# Patient Record
Sex: FEMALE | Race: WHITE | NOT HISPANIC OR LATINO | URBAN - METROPOLITAN AREA
[De-identification: names, ages, dates, MRNs, and addresses within clinical notes are randomized per-mention and may not be internally consistent; named-entity substitution may affect disease eponyms.]

---

## 2019-04-02 ENCOUNTER — IMPORTED ENCOUNTER (OUTPATIENT)
Dept: URBAN - METROPOLITAN AREA CLINIC 1 | Facility: CLINIC | Age: 69
End: 2019-04-02

## 2019-04-02 PROBLEM — H25.89: Noted: 2019-04-02

## 2019-04-02 PROBLEM — H43.811: Noted: 2019-04-02

## 2019-04-02 PROBLEM — H35.413: Noted: 2019-04-02

## 2019-04-02 PROBLEM — H35.371: Noted: 2019-04-02

## 2019-04-02 PROBLEM — H25.813: Noted: 2019-04-02

## 2019-04-02 PROBLEM — H40.023: Noted: 2019-04-02

## 2019-04-02 PROCEDURE — 92004 COMPRE OPH EXAM NEW PT 1/>: CPT

## 2019-04-02 PROCEDURE — 92015 DETERMINE REFRACTIVE STATE: CPT

## 2019-04-02 PROCEDURE — 92250 FUNDUS PHOTOGRAPHY W/I&R: CPT

## 2019-04-02 NOTE — PATIENT DISCUSSION
1.  Cataract OU: Observe for now without intervention. The patient was advised to contact us if any change or worsening of vision2. Glaucoma Suspect OU (CD 0.80 OU): PXE OS. Baseline photo done today showing disc cupping OU. Patient is considered high risk. Condition was discussed with patient and patient understands. Will continue to monitor patient for any progression in condition. Patient was advised to call us with any problems questions or concerns. 3.  Pseudoexfoliation OS - Observe 4. Epiretinal Membrane OD - Observe for change. 5. 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 Patient was cautioned to call our office immediately if they experience a substantial change in their symptoms such as an increase in floaters persistent flashes loss of visual field (may appear as a shadow or a curtain) or decrease in visual acuity as these may indicate a retinal tear or detachment. 6.  PVD w/o Tear OD - RD precautions. MRX for glasses givenReturn for an appointment in 6 months 10/DFE/OCT/glare with Dr. Veto Arevalo.

## 2019-04-02 NOTE — PATIENT DISCUSSION
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 Patient was cautioned to call our office immediately if they experience a substantial change in their symptoms such as an increase in floaters persistent flashes loss of visual field (may appear as a shadow or a curtain) or decrease in visual acuity as these may indicate a retinal tear or detachment.

## 2019-12-10 ENCOUNTER — IMPORTED ENCOUNTER (OUTPATIENT)
Dept: URBAN - METROPOLITAN AREA CLINIC 1 | Facility: CLINIC | Age: 69
End: 2019-12-10

## 2019-12-10 PROBLEM — H00.14: Noted: 2019-12-10

## 2019-12-10 PROBLEM — H01.004: Noted: 2019-12-10

## 2019-12-10 PROBLEM — H01.001: Noted: 2019-12-10

## 2019-12-10 PROCEDURE — 92012 INTRM OPH EXAM EST PATIENT: CPT

## 2019-12-10 NOTE — PATIENT DISCUSSION
1.  Early Chalazion KEELEY with early cellulitis - Keflex 500mg po BID x10 days (Disp 20) Begin Hot compresses TID x 5 minutes for 3 weeks. If without improvement discussed with patient possible Incision and Drainage procedure. Risks and benefits discussed with patient and patient states full understanding. 2. Posterior Blepharitis OU - Begin Daily hot compresses and lid scrubs were recommended. 3. Cataract OU: Observe for now without intervention. The patient was advised to contact us if any change or worsening of vision4. Glaucoma Suspect OU (CD 0.80 OU): PXE OS. Return for baseline testing once lids improved. 5.  Pseudoexfoliation OS - Observe 6. H/o Epiretinal Membrane OD 7. H/o Lattice Degeneration OU  8. H/o PVD ODReturn for an appointment in 1-2 week 10 OCT with Dr. Leona Pearl.

## 2019-12-23 ENCOUNTER — IMPORTED ENCOUNTER (OUTPATIENT)
Dept: URBAN - METROPOLITAN AREA CLINIC 1 | Facility: CLINIC | Age: 69
End: 2019-12-23

## 2019-12-23 PROBLEM — H00.15: Noted: 2019-12-23

## 2019-12-23 PROBLEM — H00.11: Noted: 2019-12-23

## 2019-12-23 PROBLEM — H00.14: Noted: 2019-12-23

## 2019-12-23 PROBLEM — H40.1131: Noted: 2019-12-23

## 2019-12-23 PROCEDURE — 92012 INTRM OPH EXAM EST PATIENT: CPT

## 2019-12-23 PROCEDURE — 92133 CPTRZD OPH DX IMG PST SGM ON: CPT

## 2019-12-23 NOTE — PATIENT DISCUSSION
1.  Chalazion RUL with early cellulitis- Begin Doxycycline 100mg po BID (Disp #60) Hot compresses TID x 5 minutes for 3 weeks. If without improvement discussed with patient possible Incision and Drainage procedure. Risks and benefits discussed with patient and patient states full understanding. 2. Residual Chalazion KEELEY- Cont Hot compresses TID x5 minutes daily w/ lid scrubs OU. 3.  Mild COAG OU (CD 0.8 OU) OCT shows mild thinning OU. Will hold on starting monotrial and consider starting gtts once chalazions are resolved. 4.  PVD OD- stable RD Precautions. 5.  Cataracts OU- observe 6. Pseudoexfoliation OS 7. Lattice Degeneration OU- stable RD Precautions. 8.  Anterior Blepharitis OU- Restart Hot compresses daily. Return for an appointment in 2-3 weeks 10 with Dr. Edith Aguilera.

## 2019-12-23 NOTE — PATIENT DISCUSSION
Chalazion left lower eyelid - Hot compresses TID x 5 minutes for 3 weeks. If without improvement discussed with patient possible Incision and Drainage procedure. Risks and benefits discussed with patient and patient states full understanding.

## 2020-01-09 ENCOUNTER — IMPORTED ENCOUNTER (OUTPATIENT)
Dept: URBAN - METROPOLITAN AREA CLINIC 1 | Facility: CLINIC | Age: 70
End: 2020-01-09

## 2020-01-09 PROBLEM — H40.1131: Noted: 2020-01-09

## 2020-01-09 PROBLEM — H00.14: Noted: 2020-01-09

## 2020-01-09 PROBLEM — H01.001: Noted: 2020-01-09

## 2020-01-09 PROBLEM — H00.11: Noted: 2020-01-09

## 2020-01-09 PROBLEM — H01.004: Noted: 2020-01-09

## 2020-01-09 PROCEDURE — 92012 INTRM OPH EXAM EST PATIENT: CPT

## 2020-01-09 NOTE — PATIENT DISCUSSION
1.  Chalazion RUL with early cellulitis- Resolved today. Cont Hot compresses x5 minutes daily. 2.  Residual Chalazion KEELEY- Resolved. Cont Hot compresses TID x5 minutes daily w/ lid scrubs OU. 3.  Mild COAG OU (CD 0.8 OU) Will begin COAG treatment this visit given improvement with lids this visit. Begin monotrial of Xelpros QHS OD (Sample given). Will recheck patient in 1 month check IOP on monotrial OD. 4.  PVD OD- stable RD Precautions. 5.  Cataracts OU- observe 6. Pseudoexfoliation OS 7. Lattice Degeneration OU- stable RD Precautions. 8.  Anterior Blepharitis OU- Cont Hot compresses x5 minutes daily. Return for an appointment in 1 mo 10 (check IOP on monotrial OD) with Dr. Marisol Traylor.

## 2020-02-18 ENCOUNTER — IMPORTED ENCOUNTER (OUTPATIENT)
Dept: URBAN - METROPOLITAN AREA CLINIC 1 | Facility: CLINIC | Age: 70
End: 2020-02-18

## 2020-02-18 PROBLEM — H40.1131: Noted: 2020-02-18

## 2020-02-18 PROCEDURE — 92012 INTRM OPH EXAM EST PATIENT: CPT

## 2020-02-18 NOTE — PATIENT DISCUSSION
1.  Mild COAG OU (CD 0.8 OU) IOP much improved on monotrial of Xelpros OD. Will now switch to Latanoprost and use QHS OU. Rx sent to patient's pharmacy. 2.  PVD OD- stable RD Precautions. 3.  Cataracts OU- observe 4. Pseudoexfoliation OS 5. Lattice Degeneration OU- stable RD Precautions. 6.  Anterior Blepharitis OU- Cont Hot compresses x5 minutes daily. Return for an appointment in 5 mo 30 VF 24-2 OU with Dr. Cecil Kuhn.

## 2020-08-03 ENCOUNTER — IMPORTED ENCOUNTER (OUTPATIENT)
Dept: URBAN - METROPOLITAN AREA CLINIC 1 | Facility: CLINIC | Age: 70
End: 2020-08-03

## 2020-08-03 PROBLEM — H25.813: Noted: 2020-08-03

## 2020-08-03 PROBLEM — H40.1131: Noted: 2020-08-03

## 2020-08-03 PROBLEM — H35.413: Noted: 2020-08-03

## 2020-08-03 PROBLEM — H35.371: Noted: 2020-08-03

## 2020-08-03 PROCEDURE — 92083 EXTENDED VISUAL FIELD XM: CPT

## 2020-08-03 PROCEDURE — 92014 COMPRE OPH EXAM EST PT 1/>: CPT

## 2020-08-03 NOTE — PATIENT DISCUSSION
1.  Mild Open Angle Glaucoma OU -- (CD 0.8 OU) IOP stable OU w/ compliance. Cont Latanoprost QHS OU. Patient to continue with current gtt regimen. Patient advised to be compliant with gtts. 2.  Cataract OU -- Observe for now without intervention. The patient was advised to contact us if any change or worsening of vision3. Pseudoexfoliation OS  4. Epiretinal Membrane OD -- Observe for change. 5. 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 RD precautions. 6.  Anterior Blepharitis OU- Cont Hot compresses x5 minutes daily. 7. PVD OD -- Stable. RD precautions. Patient defers MRx today. Return for an appointment in 6 months for a 10/DFE/OCT/glare with Dr. Pee Quispe.

## 2021-02-05 ENCOUNTER — IMPORTED ENCOUNTER (OUTPATIENT)
Dept: URBAN - METROPOLITAN AREA CLINIC 1 | Facility: CLINIC | Age: 71
End: 2021-02-05

## 2021-02-05 PROBLEM — H25.813: Noted: 2021-02-05

## 2021-02-05 PROBLEM — H40.1131: Noted: 2021-02-05

## 2021-02-05 PROCEDURE — 92015 DETERMINE REFRACTIVE STATE: CPT

## 2021-02-05 PROCEDURE — 92012 INTRM OPH EXAM EST PATIENT: CPT

## 2021-02-05 PROCEDURE — 92133 CPTRZD OPH DX IMG PST SGM ON: CPT

## 2021-02-05 NOTE — PATIENT DISCUSSION
1.  Mild Open Angle Glaucoma OU -- (CD 0.80 OU) No progression by OCT OU. IOP stable OU w/ compliance. Cont Latanoprost QHS OU. Patient to continue with current gtt regimen. Patient advised to be compliant with gtts. 2.  Cataract OU:  Visually Significant secondary to glare discussed the risks benefits alternatives and limitations of cataract surgery. The patient stated a full understanding and a desire to proceed with the procedure. The patient will need to return for preop appointment with cataract measurements and to have any additional questions answered and start pre-operative eye drops as directed. Phaco PCL OS then ODOtherwise follow-up 6 months 30/glare3. Pseudoexfoliation OS  4. Epiretinal Membrane OD -- Stable. Observe for change. 5. 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 RD precautions. 6.  Anterior Blepharitis OU- Cont Hot compresses x5 minutes daily. 7. PVD OD -- Stable. RD precautions. Return for an appointment for Ascjazmyne/H and P. with Dr. Claudeen Cobble.

## 2021-02-12 ENCOUNTER — IMPORTED ENCOUNTER (OUTPATIENT)
Dept: URBAN - METROPOLITAN AREA CLINIC 1 | Facility: CLINIC | Age: 71
End: 2021-02-12

## 2021-02-12 PROBLEM — H25.812: Noted: 2021-02-12

## 2021-02-12 PROCEDURE — 92136 OPHTHALMIC BIOMETRY: CPT

## 2021-02-12 NOTE — PATIENT DISCUSSION
1. Cataract OS -- Visually Significant secondary to glare discussed the risks benefits alternatives and limitations of cataract surgery. The patient stated a full understanding and a desire to proceed with the procedure. Discussed with patient if PO Gtts are more than $120 for all three combined when filling at their Pharmacy please call our office to request generic substitutions. Pt came to pre-op appointment alone and Lifestyle Questionnaire Completed. Pt understands they will need glasses post-op to achieve their best corrected vision.  *H/o PXE OSPhaco PCL OS

## 2021-02-19 ENCOUNTER — IMPORTED ENCOUNTER (OUTPATIENT)
Dept: URBAN - METROPOLITAN AREA CLINIC 1 | Facility: CLINIC | Age: 71
End: 2021-02-19

## 2021-02-20 ENCOUNTER — IMPORTED ENCOUNTER (OUTPATIENT)
Dept: URBAN - METROPOLITAN AREA CLINIC 1 | Facility: CLINIC | Age: 71
End: 2021-02-20

## 2021-02-20 PROBLEM — Z96.1: Noted: 2021-02-20

## 2021-02-20 PROCEDURE — 99024 POSTOP FOLLOW-UP VISIT: CPT

## 2021-02-20 NOTE — PATIENT DISCUSSION
POD#1 CE/IOL OS (Standard) -- doing well. Use Lotemax BID OS Prolensa Qdaily OS Ofloxacin TID OS. Use all three gtts through completion of PO gtt chart regimen/ Per our instructions given to patient.   Post op Warnings Reiterated RTC as scheduled ***Cont Latanoprost QHS

## 2021-02-25 ENCOUNTER — IMPORTED ENCOUNTER (OUTPATIENT)
Dept: URBAN - METROPOLITAN AREA CLINIC 1 | Facility: CLINIC | Age: 71
End: 2021-02-25

## 2021-02-25 PROBLEM — H25.811: Noted: 2021-02-25

## 2021-02-25 PROCEDURE — 92136 OPHTHALMIC BIOMETRY: CPT

## 2021-02-25 NOTE — PATIENT DISCUSSION
1.  Cataract OD: Visually Significant secondary to glare discussed the risks benefits alternatives and limitations of cataract surgery. The patient stated a full understanding and a desire to proceed with the procedure. Discussed with patient if PO Gtts are more than $120 for all three combined when filling at their Pharmacy please call our office to request generic substitutions. Pt understands they will need glasses post-op to achieve their best corrected vision. Phaco PCL OD *H/o ERM OD 2. POW#3  CE/IOL OS (Standard) -- doing well. *PXE OS  Use Lotemax BID OS and Prolensa Qdaily OS: Use gtts through completion of PO gtt regimen.  **Cont Latanoprost QHS OUF/u as scheduled 2nd eye

## 2021-03-03 ENCOUNTER — IMPORTED ENCOUNTER (OUTPATIENT)
Dept: URBAN - METROPOLITAN AREA CLINIC 1 | Facility: CLINIC | Age: 71
End: 2021-03-03

## 2021-03-04 ENCOUNTER — IMPORTED ENCOUNTER (OUTPATIENT)
Dept: URBAN - METROPOLITAN AREA CLINIC 1 | Facility: CLINIC | Age: 71
End: 2021-03-04

## 2021-03-04 PROBLEM — Z96.1: Noted: 2021-03-04

## 2021-03-04 PROCEDURE — 99024 POSTOP FOLLOW-UP VISIT: CPT

## 2021-03-04 NOTE — PATIENT DISCUSSION
1. POD#1 Phaco/ PCL OD (Standard) - doing well. *H/o ERM OD Use Lotemax BID OD Prolensa Qdaily OD Ocuflox TID OD : Use all three gtts through completion of PO gtt chart regimen/ Per our instructions given. Post op Warnings Reiterated 2. POW#3 Phaco/ PCL OS (Standard) - doing well *PXE OS Use Lotemax BID OS and Prolensa Qdaily OS: Use gtts through completion of PO gtt regimen.  **Cont Latanoprost QHS OURTC as scheduled

## 2021-03-26 ENCOUNTER — IMPORTED ENCOUNTER (OUTPATIENT)
Dept: URBAN - METROPOLITAN AREA CLINIC 1 | Facility: CLINIC | Age: 71
End: 2021-03-26

## 2021-03-26 PROBLEM — Z09: Noted: 2021-03-26

## 2021-03-26 PROCEDURE — 99024 POSTOP FOLLOW-UP VISIT: CPT

## 2021-03-26 NOTE — PATIENT DISCUSSION
POW#3 Phaco/ PCL OU (Standard OU) - doing well. *PXE OS. *H/o ERM OD. Finish PO meds per PO gtt schedule MRX for glasses given **Cont Latanorpost QHS OUReturn for an appointment in August 30/HVF with Dr. Chris Ruiz.

## 2021-08-30 ENCOUNTER — IMPORTED ENCOUNTER (OUTPATIENT)
Dept: URBAN - METROPOLITAN AREA CLINIC 1 | Facility: CLINIC | Age: 71
End: 2021-08-30

## 2021-08-30 PROBLEM — H40.1131: Noted: 2021-08-30

## 2021-08-30 PROBLEM — H43.811: Noted: 2021-08-30

## 2021-08-30 PROBLEM — H35.371: Noted: 2021-08-30

## 2021-08-30 PROBLEM — H04.123: Noted: 2021-08-30

## 2021-08-30 PROBLEM — H01.004: Noted: 2021-08-30

## 2021-08-30 PROBLEM — Z96.1: Noted: 2021-08-30

## 2021-08-30 PROBLEM — H01.001: Noted: 2021-08-30

## 2021-08-30 PROCEDURE — 99214 OFFICE O/P EST MOD 30 MIN: CPT

## 2021-08-30 NOTE — PATIENT DISCUSSION
1.  Mild Open Angle Glaucoma OU (CD 0.80 OU) - IOP stable continue Latanoprost QHS OU. Patient advised to be compliant with gtts. Condition was discussed with patient and patient understands. Will continue to monitor patient for any progression in condition. Patient was advised to call us with any problems questions or concerns. 2.  Epiretinal Membrane OD - Observe for change. 3. Dry Eyes OU - Recommend ATs TID OU routinely 4. Pseudophakia OU - (Standard OU) 5. Anterior Blepharitis OU - Daily Hot compresses and lid scrubs were recommended. 6. PVD w/o Tear OD - RD precautions. Patient defers the refraction at today's visitReturn for an appointment in 6 months 10/OCT with Dr. Rosana Cardenas.

## 2022-02-01 ENCOUNTER — IMPORTED ENCOUNTER (OUTPATIENT)
Dept: URBAN - METROPOLITAN AREA CLINIC 1 | Facility: CLINIC | Age: 72
End: 2022-02-01

## 2022-02-01 PROBLEM — H40.1131: Noted: 2022-02-01

## 2022-02-01 PROBLEM — H11.32: Noted: 2022-02-01

## 2022-02-01 PROCEDURE — 99213 OFFICE O/P EST LOW 20 MIN: CPT

## 2022-02-01 PROCEDURE — 92133 CPTRZD OPH DX IMG PST SGM ON: CPT

## 2022-02-01 NOTE — PATIENT DISCUSSION
1.  Subconjunctival Hemorrhage OS -- Resolving. Reassurance given to patient. 2.  Mild Open Angle Glaucoma OU -- (CD 0.80 OU) No progression by OCT. IOP stable continue Latanoprost QHS OU. Patient advised to be compliant with gtts. Condition was discussed with patient and patient understands. Will continue to monitor patient for any progression in condition. Patient was advised to call us with any problems questions or concerns. 3.  GILBERT w/ PEK OU -- Recommend ATs TID OU routinely. 4.  Anterior Blepharitis OU -- Daily Hot compresses and lid scrubs were recommended. 5. Pseudophakia OU (Standard OU) -- PXE OS6. H/o Epiretinal Membrane OD 7. H/o PVD w/o Tear OD 8. H/o Lattice Degeneration OU without Tear Return for an appointment in 6 months 30/VF with Dr. Jaime Higgins.

## 2022-03-15 ENCOUNTER — EMERGENCY VISIT (OUTPATIENT)
Dept: URBAN - METROPOLITAN AREA CLINIC 1 | Facility: CLINIC | Age: 72
End: 2022-03-15

## 2022-03-15 DIAGNOSIS — H00.15: ICD-10-CM

## 2022-03-15 DIAGNOSIS — H00.14: ICD-10-CM

## 2022-03-15 PROCEDURE — 92012 INTRM OPH EXAM EST PATIENT: CPT

## 2022-03-15 ASSESSMENT — VISUAL ACUITY
OS_SC: 20/30
OD_SC: 20/20

## 2022-03-15 ASSESSMENT — TONOMETRY
OS_IOP_MMHG: 12
OD_IOP_MMHG: 12

## 2022-03-15 NOTE — PATIENT DISCUSSION
(Observe) Begin hot compresses TID x 5 minutes for 3 weeks. If without improvement discussed with patient possible Incision and Drainage procedure. Risks and benefits discussed with patient and patient states full understanding.

## 2022-03-15 NOTE — PATIENT DISCUSSION
Continue Latanoprost ou qhs. Patient advised to be compliant with gtts. Condition was discussed with patient and patient understands. Will continue to monitor patient for any progression in condition. Patient was advised to call us with any problems, questions, or concerns.

## 2022-04-02 ASSESSMENT — TONOMETRY
OD_IOP_MMHG: 17
OD_IOP_MMHG: 14
OS_IOP_MMHG: 14
OD_IOP_MMHG: 14
OS_IOP_MMHG: 14
OS_IOP_MMHG: 13
OS_IOP_MMHG: 18
OD_IOP_MMHG: 13
OD_IOP_MMHG: 14
OD_IOP_MMHG: 15
OD_IOP_MMHG: 15
OD_IOP_MMHG: 13
OD_IOP_MMHG: 14
OD_IOP_MMHG: 10
OS_IOP_MMHG: 17
OS_IOP_MMHG: 15
OS_IOP_MMHG: 14
OD_IOP_MMHG: 17
OD_IOP_MMHG: 13
OS_IOP_MMHG: 15
OS_IOP_MMHG: 15
OS_IOP_MMHG: 16
OS_IOP_MMHG: 15

## 2022-04-02 ASSESSMENT — VISUAL ACUITY
OS_GLARE: 20/80
OS_CC: 20/30-1
OS_GLARE: 20/60
OD_CC: 20/20
OD_CC: 20/30+2
OS_GLARE: 20/60
OD_CC: 20/20
OD_CC: 20/25-2
OS_CC: 20/25
OS_CC: 20/20
OD_GLARE: 20/80
OS_CC: 20/40
OS_SC: 20/30
OD_CC: 20/30-1
OS_CC: 20/25-1
OS_CC: 20/20
OD_CC: 20/40
OS_CC: 20/30-1
OD_SC: 20/40
OS_CC: 20/25
OD_CC: 20/25
OS_CC: 20/25
OD_GLARE: 20/60
OS_CC: 20/25+2
OS_CC: 20/25
OS_CC: 20/25-1
OS_CC: 20/20
OD_GLARE: 20/60
OD_CC: 20/50
OD_CC: 20/25-2
OD_CC: 20/25
OD_CC: 20/20

## 2022-04-02 ASSESSMENT — KERATOMETRY
OS_AXISANGLE2_DEGREES: 145
OS_K2POWER_DIOPTERS: 43.50
OS_K1POWER_DIOPTERS: 43.75
OD_K1POWER_DIOPTERS: 44.25
OD_AXISANGLE_DEGREES: 066
OD_K2POWER_DIOPTERS: 45.00
OD_AXISANGLE2_DEGREES: 156
OS_AXISANGLE_DEGREES: 055

## 2023-04-11 ENCOUNTER — COMPREHENSIVE EXAM (OUTPATIENT)
Dept: URBAN - METROPOLITAN AREA CLINIC 1 | Facility: CLINIC | Age: 73
End: 2023-04-11

## 2023-04-11 DIAGNOSIS — H04.123: ICD-10-CM

## 2023-04-11 DIAGNOSIS — H16.143: ICD-10-CM

## 2023-04-11 DIAGNOSIS — H40.1131: ICD-10-CM

## 2023-04-11 DIAGNOSIS — H35.371: ICD-10-CM

## 2023-04-11 DIAGNOSIS — H43.811: ICD-10-CM

## 2023-04-11 DIAGNOSIS — H10.45: ICD-10-CM

## 2023-04-11 DIAGNOSIS — H35.413: ICD-10-CM

## 2023-04-11 PROCEDURE — 92083 EXTENDED VISUAL FIELD XM: CPT

## 2023-04-11 PROCEDURE — 92014 COMPRE OPH EXAM EST PT 1/>: CPT

## 2023-04-11 ASSESSMENT — VISUAL ACUITY
OS_SC: 20/20
OD_CC: J1+
OS_CC: J1+
OD_SC: 20/20

## 2023-04-11 ASSESSMENT — TONOMETRY
OD_IOP_MMHG: 12
OS_IOP_MMHG: 12

## 2023-12-04 ENCOUNTER — FOLLOW UP (OUTPATIENT)
Dept: URBAN - METROPOLITAN AREA CLINIC 1 | Facility: CLINIC | Age: 73
End: 2023-12-04

## 2023-12-04 DIAGNOSIS — H16.143: ICD-10-CM

## 2023-12-04 DIAGNOSIS — H40.1131: ICD-10-CM

## 2023-12-04 DIAGNOSIS — H04.123: ICD-10-CM

## 2023-12-04 DIAGNOSIS — H35.371: ICD-10-CM

## 2023-12-04 PROCEDURE — 99213 OFFICE O/P EST LOW 20 MIN: CPT

## 2023-12-04 PROCEDURE — 92133 CPTRZD OPH DX IMG PST SGM ON: CPT

## 2023-12-04 ASSESSMENT — TONOMETRY
OD_IOP_MMHG: 13
OS_IOP_MMHG: 13

## 2023-12-04 ASSESSMENT — VISUAL ACUITY
OS_SC: 20/20
OD_SC: 20/20-1

## 2024-06-10 ENCOUNTER — COMPREHENSIVE EXAM (OUTPATIENT)
Dept: URBAN - METROPOLITAN AREA CLINIC 1 | Facility: CLINIC | Age: 74
End: 2024-06-10

## 2024-06-10 DIAGNOSIS — H35.413: ICD-10-CM

## 2024-06-10 DIAGNOSIS — H04.123: ICD-10-CM

## 2024-06-10 DIAGNOSIS — H35.371: ICD-10-CM

## 2024-06-10 DIAGNOSIS — Z96.1: ICD-10-CM

## 2024-06-10 DIAGNOSIS — H40.1131: ICD-10-CM

## 2024-06-10 DIAGNOSIS — H10.45: ICD-10-CM

## 2024-06-10 DIAGNOSIS — H16.143: ICD-10-CM

## 2024-06-10 DIAGNOSIS — H43.813: ICD-10-CM

## 2024-06-10 PROCEDURE — 92083 EXTENDED VISUAL FIELD XM: CPT

## 2024-06-10 PROCEDURE — 99214 OFFICE O/P EST MOD 30 MIN: CPT

## 2024-06-10 ASSESSMENT — VISUAL ACUITY
OD_SC: 20/25+2
OS_SC: 20/20-1

## 2024-06-10 ASSESSMENT — TONOMETRY
OD_IOP_MMHG: 14
OS_IOP_MMHG: 15

## 2024-12-12 ENCOUNTER — FOLLOW UP (OUTPATIENT)
Age: 74
End: 2024-12-12

## 2024-12-12 DIAGNOSIS — H40.1131: ICD-10-CM

## 2024-12-12 PROCEDURE — 99213 OFFICE O/P EST LOW 20 MIN: CPT

## 2024-12-12 PROCEDURE — 92133 CPTRZD OPH DX IMG PST SGM ON: CPT

## 2025-06-23 ENCOUNTER — COMPREHENSIVE EXAM (OUTPATIENT)
Age: 75
End: 2025-06-23

## 2025-06-23 DIAGNOSIS — H43.813: ICD-10-CM

## 2025-06-23 DIAGNOSIS — H35.413: ICD-10-CM

## 2025-06-23 DIAGNOSIS — H04.123: ICD-10-CM

## 2025-06-23 DIAGNOSIS — H35.371: ICD-10-CM

## 2025-06-23 DIAGNOSIS — Z96.1: ICD-10-CM

## 2025-06-23 DIAGNOSIS — H16.143: ICD-10-CM

## 2025-06-23 DIAGNOSIS — H10.45: ICD-10-CM

## 2025-06-23 DIAGNOSIS — H40.1131: ICD-10-CM

## 2025-06-23 PROCEDURE — 92015 DETERMINE REFRACTIVE STATE: CPT

## 2025-06-23 PROCEDURE — 92083 EXTENDED VISUAL FIELD XM: CPT

## 2025-06-23 PROCEDURE — 99214 OFFICE O/P EST MOD 30 MIN: CPT
